# Patient Record
Sex: MALE | Race: WHITE | Employment: OTHER | URBAN - METROPOLITAN AREA
[De-identification: names, ages, dates, MRNs, and addresses within clinical notes are randomized per-mention and may not be internally consistent; named-entity substitution may affect disease eponyms.]

---

## 2017-01-24 PROBLEM — Z86.39 HISTORY OF ELEVATED GLUCOSE: Status: ACTIVE | Noted: 2017-01-24

## 2017-01-24 PROBLEM — F20.9 SCHIZOPHRENIA (HCC): Status: ACTIVE | Noted: 2017-01-24

## 2017-01-24 NOTE — PATIENT INSTRUCTIONS
Increasing abilify dosage from 10-->15 mg daily.   Follow up with [facility] [address] [place] [address] [phone]     Get fasting labs in 2-4 weeks
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
## 2017-02-02 ENCOUNTER — TELEPHONE (OUTPATIENT)
Dept: NEUROLOGY | Facility: CLINIC | Age: 32
End: 2017-02-02

## 2017-02-03 ENCOUNTER — OFFICE VISIT (OUTPATIENT)
Dept: NEUROLOGY | Facility: CLINIC | Age: 32
End: 2017-02-03

## 2017-02-03 VITALS — WEIGHT: 170 LBS | HEIGHT: 72 IN | RESPIRATION RATE: 15 BRPM | BODY MASS INDEX: 23.03 KG/M2

## 2017-02-03 DIAGNOSIS — R68.89 SPELLS OF DECREASED ATTENTIVENESS: Primary | ICD-10-CM

## 2017-02-03 PROCEDURE — 95816 EEG AWAKE AND DROWSY: CPT | Performed by: OTHER

## 2017-02-03 PROCEDURE — 99203 OFFICE O/P NEW LOW 30 MIN: CPT | Performed by: OTHER

## 2017-02-03 NOTE — PROGRESS NOTES
[facility]   Neurology Exam Note  Loran Kehr, MD    Patient:  Venkata Quiroga  :   1985    HPI:   Patient presents with:  Neurologic Problem    This patient is here with his mother.   He has a history of moderate intellectua total) by mouth 2 (two) times daily. Disp: 60 tablet Rfl: 1      Past Medical History   Diagnosis Date   • Intellectual disability    • Mitral valve prolapse           Past Surgical History    TONSILLECTOMY        No family history on file.    Social Histor increased tone. Sensory: Intact to Vibration, fine touch, and pin prick to the UE and LE.   DTR: 2+ in the upper and lower extremities, downgoing toes, no hoffmans, no clonus  Coordination: Finger to nose, heel to shin intact bilaterally.    Gait: Narrow

## 2017-02-03 NOTE — PATIENT INSTRUCTIONS
Refill policies:    • Allow 2 business days for refills; controlled substances may take longer. • Contact our office at least 5 days prior to running out of medication or submit request through the “request refill” option in your [employer] account.   • Ref have a procedure or additional testing performed. [facility]) will contact your insurance carrier to obtain pre-certification or prior authorization.     Unfortunately, MICHELLE has seen an increase in denial of payment even though the p

## 2017-02-07 ENCOUNTER — HOSPITAL ENCOUNTER (OUTPATIENT)
Dept: ELECTROPHYSIOLOGY | Facility: HOSPITAL | Age: 32
Discharge: HOME | End: 2017-02-07
Attending: Other
Payer: MEDICAID

## 2017-02-07 DIAGNOSIS — R68.89 SPELLS OF DECREASED ATTENTIVENESS: Primary | ICD-10-CM

## 2017-02-07 PROCEDURE — 95816 EEG AWAKE AND DROWSY: CPT

## 2017-02-13 ENCOUNTER — TELEPHONE (OUTPATIENT)
Dept: NEUROLOGY | Facility: CLINIC | Age: 32
End: 2017-02-13

## 2017-02-13 NOTE — TELEPHONE ENCOUNTER
Notified pt mother of Dr. Pastor Lipoma message below. She verbalized understanding and was thankful for the call.

## 2017-04-11 VITALS
DIASTOLIC BLOOD PRESSURE: 80 MMHG | HEART RATE: 122 BPM | OXYGEN SATURATION: 94 % | TEMPERATURE: 98 F | SYSTOLIC BLOOD PRESSURE: 115 MMHG | WEIGHT: 170 LBS | RESPIRATION RATE: 20 BRPM | BODY MASS INDEX: 23.03 KG/M2 | HEIGHT: 72 IN

## 2017-04-11 PROCEDURE — 96374 THER/PROPH/DIAG INJ IV PUSH: CPT

## 2017-04-11 PROCEDURE — 99284 EMERGENCY DEPT VISIT MOD MDM: CPT

## 2017-04-12 ENCOUNTER — HOSPITAL ENCOUNTER (EMERGENCY)
Facility: HOSPITAL | Age: 32
Discharge: HOME | End: 2017-04-12
Attending: EMERGENCY MEDICINE
Payer: MEDICAID

## 2017-04-12 DIAGNOSIS — R11.2 NON-INTRACTABLE VOMITING WITH NAUSEA, UNSPECIFIED VOMITING TYPE: Primary | ICD-10-CM

## 2017-04-12 PROCEDURE — 85025 COMPLETE CBC W/AUTO DIFF WBC: CPT | Performed by: EMERGENCY MEDICINE

## 2017-04-12 PROCEDURE — 80048 BASIC METABOLIC PNL TOTAL CA: CPT | Performed by: EMERGENCY MEDICINE

## 2017-04-12 RX ORDER — ONDANSETRON 8 MG/1
TABLET, ORALLY DISINTEGRATING ORAL
Status: DISCONTINUED
Start: 2017-04-12 | End: 2017-04-12

## 2017-04-12 RX ORDER — ONDANSETRON 2 MG/ML
4 INJECTION INTRAMUSCULAR; INTRAVENOUS ONCE
Status: COMPLETED | OUTPATIENT
Start: 2017-04-12 | End: 2017-04-12

## 2017-04-12 RX ORDER — HALOPERIDOL 1 MG/1
2 TABLET ORAL ONCE
Status: COMPLETED | OUTPATIENT
Start: 2017-04-12 | End: 2017-04-12

## 2017-04-12 NOTE — ED PROVIDER NOTES
Patient Seen in: [facility] AND [facility] Emergency Department    History   Patient presents with:  Nausea/Vomiting/Diarrhea (gastrointestinal)      HPI    Patient presents to the ED with his parents after having intermittent vomiting for the past 24 hours.   Pa 04/11/17 2345 94 %   O2 Device 04/11/17 2345 None (Room air)       Physical Exam   Constitutional: He appears well-developed and well-nourished. No distress. HENT:   Head: Normocephalic and atraumatic. Eyes: Right eye exhibits no discharge.  Left eye ex Patient in no significant distress on ED evaluation. Denying complaints, abdomen soft and nontender. Laboratory testing obtained, no significant abnormalities. Patient given aggressive IV fluids in the ED and IV Zofran.   Much improved and tolerating p.o
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
## 2017-07-14 NOTE — BH PROGRESS NOTE
Received called from [address] at [facility] requesting more information about the pt's insurance  Faxed copy of card and ecare printout
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
## 2017-07-14 NOTE — BH PROGRESS NOTE
Parents have already spoke with Linda and set up appointment, but not until September for outpatient resources. Spoke with Linda and they do not take overnight transfers of this type of case.  Stated they had 6 admissions to that unit today and to c
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
## 2017-08-01 PROCEDURE — 80050 GENERAL HEALTH PANEL: CPT | Performed by: INTERNAL MEDICINE

## 2017-08-01 PROCEDURE — 80061 LIPID PANEL: CPT | Performed by: INTERNAL MEDICINE

## 2017-08-01 NOTE — PROGRESS NOTES
Pt's  verified  Pt presented for a fasting blood draw. Per Dr. David Cosby ordered CBC CMP LIPID TSHR. Pt tolerated venipuncture well,specimens drawn from RT  arm  and sent out to [address] [place] [address] lab for testing.

## 2017-08-01 NOTE — PROGRESS NOTES
HPI:    Patient ID: Jacobo Mann is a 27year old male. HPIpt here for a fu on mental retardation and behavioral issues. Was residing in a group home up until September of last year.   Has been diagnosed with scizophrenia as well as beavioral probl Soft. There is no rebound. Musculoskeletal: He exhibits no edema. Neurological:   Confused somewhat anxious   Skin: No rash noted. No erythema.    Psychiatric:   Disoriented not aggressive              ASSESSMENT/PLAN:   Mental retardation  (primary enc

## 2018-03-20 ENCOUNTER — OFFICE VISIT (OUTPATIENT)
Dept: INTERNAL MEDICINE CLINIC | Facility: CLINIC | Age: 33
End: 2018-03-20

## 2018-03-20 VITALS
TEMPERATURE: 99 F | RESPIRATION RATE: 18 BRPM | HEIGHT: 72 IN | DIASTOLIC BLOOD PRESSURE: 70 MMHG | SYSTOLIC BLOOD PRESSURE: 116 MMHG | BODY MASS INDEX: 26.95 KG/M2 | OXYGEN SATURATION: 98 % | HEART RATE: 93 BPM | WEIGHT: 199 LBS

## 2018-03-20 DIAGNOSIS — Z00.00 WELLNESS EXAMINATION: Primary | ICD-10-CM

## 2018-03-20 DIAGNOSIS — F25.0 SCHIZOAFFECTIVE DISORDER, BIPOLAR TYPE (HCC): ICD-10-CM

## 2018-03-20 DIAGNOSIS — F79 MENTAL RETARDATION: ICD-10-CM

## 2018-03-20 PROCEDURE — 99395 PREV VISIT EST AGE 18-39: CPT | Performed by: INTERNAL MEDICINE

## 2018-03-20 RX ORDER — OLANZAPINE 5 MG/1
TABLET ORAL
Refills: 1 | COMMUNITY
Start: 2018-03-19 | End: 2019-04-22

## 2018-03-20 RX ORDER — OLANZAPINE 10 MG/1
TABLET ORAL
Refills: 1 | COMMUNITY
Start: 2018-03-10

## 2018-03-20 RX ORDER — LORAZEPAM 2 MG/1
TABLET ORAL
Refills: 2 | COMMUNITY
Start: 2018-02-08

## 2018-03-20 RX ORDER — DIVALPROEX SODIUM 250 MG/1
TABLET, DELAYED RELEASE ORAL
Refills: 1 | COMMUNITY
Start: 2018-03-10 | End: 2019-04-22

## 2018-03-20 NOTE — PROGRESS NOTES
HPI:    Patient ID: Patricia Garner is a 34year old male.     Pt here for fu on mental reterdation and newly diagnosed schizoaffective disorder - had recent labs to monitor level of depakoate and metabolic issues- only issue is mildly elevated cholestero breath sounds normal.   Abdominal: He exhibits no mass. There is no tenderness. Musculoskeletal: He exhibits no tenderness. Neurological: He is alert. Coordination normal.   Skin: No rash noted.    Psychiatric: His behavior is normal.   Slowed cognition

## 2018-04-27 ENCOUNTER — TELEPHONE (OUTPATIENT)
Dept: INTERNAL MEDICINE CLINIC | Facility: CLINIC | Age: 33
End: 2018-04-27

## 2018-04-27 NOTE — TELEPHONE ENCOUNTER
Pt's  verified  Per Dr Barnes Failing she spoke with Pt's mother Isreal Wei re: forms being ready for - given to  PPU- copy sent to scananaing

## 2019-04-22 ENCOUNTER — OFFICE VISIT (OUTPATIENT)
Dept: INTERNAL MEDICINE CLINIC | Facility: CLINIC | Age: 34
End: 2019-04-22
Payer: MEDICAID

## 2019-04-22 VITALS
HEART RATE: 114 BPM | SYSTOLIC BLOOD PRESSURE: 118 MMHG | HEIGHT: 72 IN | OXYGEN SATURATION: 98 % | BODY MASS INDEX: 29.66 KG/M2 | RESPIRATION RATE: 13 BRPM | WEIGHT: 219 LBS | DIASTOLIC BLOOD PRESSURE: 74 MMHG

## 2019-04-22 DIAGNOSIS — F25.9 SCHIZOAFFECTIVE DISORDER, UNSPECIFIED TYPE (HCC): ICD-10-CM

## 2019-04-22 DIAGNOSIS — R73.9 HYPERGLYCEMIA: ICD-10-CM

## 2019-04-22 DIAGNOSIS — Z00.00 WELLNESS EXAMINATION: Primary | ICD-10-CM

## 2019-04-22 DIAGNOSIS — F71 MENTAL RETARDATION, MODERATE (I.Q. 35-49): ICD-10-CM

## 2019-04-22 PROCEDURE — 83036 HEMOGLOBIN GLYCOSYLATED A1C: CPT | Performed by: INTERNAL MEDICINE

## 2019-04-22 PROCEDURE — 80053 COMPREHEN METABOLIC PANEL: CPT | Performed by: INTERNAL MEDICINE

## 2019-04-22 PROCEDURE — 99395 PREV VISIT EST AGE 18-39: CPT | Performed by: INTERNAL MEDICINE

## 2019-04-22 PROCEDURE — 36415 COLL VENOUS BLD VENIPUNCTURE: CPT | Performed by: INTERNAL MEDICINE

## 2019-04-22 PROCEDURE — 84443 ASSAY THYROID STIM HORMONE: CPT | Performed by: INTERNAL MEDICINE

## 2019-04-22 PROCEDURE — 80061 LIPID PANEL: CPT | Performed by: INTERNAL MEDICINE

## 2019-04-22 PROCEDURE — 82140 ASSAY OF AMMONIA: CPT | Performed by: INTERNAL MEDICINE

## 2019-04-22 RX ORDER — CLONAZEPAM 0.5 MG/1
TABLET ORAL
Refills: 2 | COMMUNITY
Start: 2019-01-04

## 2019-04-22 NOTE — PROGRESS NOTES
Pt presented to clinic today for blood draw. Per physician able to draw orders. Orders  documented within chart. Pt tolerated lab draw well.  verified. Orders drawn include: Ammonia, TSH.  Lipid, A1C, CMP, Fragile X  Site of draw: left arm

## 2019-04-22 NOTE — PROGRESS NOTES
HPI:    Patient ID: Junie Hull is a 28year old male. Pt here for a yearly physical exam and some labs. Father would like to have testing for fragile x syndrome.   IS under care of psychiatrist for anxiety and auditory hallucinations - on zyprexa Normocephalic. Right Ear: Cerumen present . Left Ear: Cerumen present. Mouth/Throat: No oropharyngeal exudate or pharynx erythema. Eyes: Pupils are equal, round, and reactive to light. No scleral icterus. Neck: Neck supple. No JVD present.    Cardio

## 2019-06-05 ENCOUNTER — OFFICE VISIT (OUTPATIENT)
Dept: INTERNAL MEDICINE CLINIC | Facility: CLINIC | Age: 34
End: 2019-06-05
Payer: MEDICARE

## 2019-06-05 VITALS
WEIGHT: 220.81 LBS | BODY MASS INDEX: 29.91 KG/M2 | SYSTOLIC BLOOD PRESSURE: 120 MMHG | DIASTOLIC BLOOD PRESSURE: 84 MMHG | HEART RATE: 92 BPM | HEIGHT: 72 IN | OXYGEN SATURATION: 95 %

## 2019-06-05 DIAGNOSIS — B35.1 ONYCHOMYCOSIS OF LEFT GREAT TOE: Primary | ICD-10-CM

## 2019-06-05 PROCEDURE — 99213 OFFICE O/P EST LOW 20 MIN: CPT | Performed by: INTERNAL MEDICINE

## 2019-06-05 RX ORDER — TERBINAFINE HYDROCHLORIDE 250 MG/1
250 TABLET ORAL DAILY
Qty: 30 TABLET | Refills: 3 | Status: SHIPPED | OUTPATIENT
Start: 2019-06-05

## 2019-06-05 NOTE — PROGRESS NOTES
HPI:    Patient ID: Flo Crisostomo is a 28year old male. Pt here for evaluation of bilateral toenail fungus on both feet Therapist, Clinical  Another, Therapist  Example, Therapist   left nail is markedly hypertrophic and deformed.     Review of Syst No prescriptions requested or ordered in this encounter       Imaging & Referrals:  None         Hugo Krause MD  6/5/2019

## 2019-06-18 ENCOUNTER — OFFICE VISIT (OUTPATIENT)
Dept: PODIATRY CLINIC | Facility: CLINIC | Age: 34
End: 2019-06-18
Payer: MEDICARE

## 2019-06-18 VITALS — WEIGHT: 220 LBS | HEIGHT: 72 IN | BODY MASS INDEX: 29.8 KG/M2

## 2019-06-18 DIAGNOSIS — B35.1 ONYCHOMYCOSIS: Primary | ICD-10-CM

## 2019-06-18 PROCEDURE — 99202 OFFICE O/P NEW SF 15 MIN: CPT | Performed by: PODIATRIST

## 2019-06-18 PROCEDURE — G0463 HOSPITAL OUTPT CLINIC VISIT: HCPCS | Performed by: PODIATRIST

## 2019-06-18 NOTE — PROGRESS NOTES
HPI:    Patient ID: Opal Sylvester is a 28year old male. This pleasant 42-year-old male presents as a new patient to me on consult from [address] [place] [address]. Accompanied today by his dad and the primary concern is associated with both of his great toenails. use of the medication, concerns, and expectations. He should complete 1 tablet/day for 90 days. Today I reduced more than 50% of the left great toenail without incident. It is not open or draining.   I discussed appropriate nail care and is well expectat

## 2019-06-26 ENCOUNTER — TELEPHONE (OUTPATIENT)
Dept: PODIATRY CLINIC | Facility: CLINIC | Age: 34
End: 2019-06-26

## 2019-06-26 NOTE — TELEPHONE ENCOUNTER
Pts father asking for after visit summary for 6/18 appt, states he can  at office. Pls advise thank you.

## 2019-06-26 NOTE — TELEPHONE ENCOUNTER
S/w Anjana Martin, pt's father. Let him know the AVS from 6/18 is at the . He states he will pick it up tomorrow.

## 2021-11-30 ENCOUNTER — TELEPHONE (OUTPATIENT)
Dept: INTERNAL MEDICINE CLINIC | Facility: CLINIC | Age: 36
End: 2021-11-30

## 2021-11-30 NOTE — TELEPHONE ENCOUNTER
Phone kept ringing wasn't able to LVM.     Spoke with father who stated that the pt now lives in [address] [place] [address]

## 2022-08-29 ENCOUNTER — TELEPHONE (OUTPATIENT)
Dept: INTERNAL MEDICINE CLINIC | Facility: CLINIC | Age: 37
End: 2022-08-29

## 2022-08-29 NOTE — TELEPHONE ENCOUNTER
Patient has a new PCP, Dr. Ashley Herrera. The last visit with Dr. Abram Angelucci was back in 2019.

## 2023-06-17 ENCOUNTER — OFFICE VISIT (OUTPATIENT)
Dept: FAMILY MEDICINE CLINIC | Facility: CLINIC | Age: 38
End: 2023-06-17
Payer: MEDICARE

## 2023-06-17 VITALS
RESPIRATION RATE: 16 BRPM | DIASTOLIC BLOOD PRESSURE: 84 MMHG | OXYGEN SATURATION: 98 % | SYSTOLIC BLOOD PRESSURE: 131 MMHG | HEART RATE: 95 BPM | TEMPERATURE: 97 F | WEIGHT: 215 LBS | HEIGHT: 71 IN | BODY MASS INDEX: 30.1 KG/M2

## 2023-06-17 DIAGNOSIS — H65.191 OTHER NON-RECURRENT ACUTE NONSUPPURATIVE OTITIS MEDIA OF RIGHT EAR: ICD-10-CM

## 2023-06-17 DIAGNOSIS — H60.501 ACUTE OTITIS EXTERNA OF RIGHT EAR, UNSPECIFIED TYPE: Primary | ICD-10-CM

## 2023-06-17 DIAGNOSIS — H61.22 IMPACTED CERUMEN OF LEFT EAR: ICD-10-CM

## 2023-06-17 PROCEDURE — 99213 OFFICE O/P EST LOW 20 MIN: CPT | Performed by: NURSE PRACTITIONER

## 2023-06-17 RX ORDER — CIPROFLOXACIN AND DEXAMETHASONE 3; 1 MG/ML; MG/ML
4 SUSPENSION/ DROPS AURICULAR (OTIC) 2 TIMES DAILY
Qty: 1 EACH | Refills: 0 | Status: SHIPPED | OUTPATIENT
Start: 2023-06-17 | End: 2023-06-27

## 2023-06-17 RX ORDER — AZITHROMYCIN 250 MG/1
TABLET, FILM COATED ORAL
Qty: 6 TABLET | Refills: 0 | Status: SHIPPED | OUTPATIENT
Start: 2023-06-17 | End: 2023-06-22

## 2023-12-26 ENCOUNTER — APPOINTMENT (OUTPATIENT)
Dept: GENERAL RADIOLOGY | Age: 38
End: 2023-12-26
Attending: NURSE PRACTITIONER
Payer: MEDICARE

## 2023-12-26 ENCOUNTER — HOSPITAL ENCOUNTER (OUTPATIENT)
Age: 38
Discharge: HOME | End: 2023-12-26
Payer: MEDICARE

## 2023-12-26 VITALS
OXYGEN SATURATION: 100 % | RESPIRATION RATE: 19 BRPM | SYSTOLIC BLOOD PRESSURE: 140 MMHG | TEMPERATURE: 98 F | HEART RATE: 78 BPM | DIASTOLIC BLOOD PRESSURE: 90 MMHG

## 2023-12-26 DIAGNOSIS — S92.414A CLOSED NONDISPLACED FRACTURE OF PROXIMAL PHALANX OF RIGHT GREAT TOE, INITIAL ENCOUNTER: Primary | ICD-10-CM

## 2023-12-26 PROCEDURE — 73660 X-RAY EXAM OF TOE(S): CPT | Performed by: NURSE PRACTITIONER

## 2023-12-26 PROCEDURE — 99213 OFFICE O/P EST LOW 20 MIN: CPT | Performed by: NURSE PRACTITIONER
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
## 2023-12-26 NOTE — DISCHARGE INSTRUCTIONS
Ice and elevate. Ibuprofen or Tylenol as needed for pain. Make a follow-up appointment with podiatry. Return for any concerns.

## (undated) NOTE — MR AVS SNAPSHOT
[facility]  [address], [address] [facility]  [address] [facility] [phone]               Thank you for choosing us for your health care visit with Tommy Joshua MD.  We are glad to serve you and happy to provide you with this summary of your family member will be picking up prescription, office must be given name of individual in advance and they must present an ID as well. ? The name of the person picking up your prescription must be documented in your chart.   Scheduling Tests    If your phy Take 1 tablet (15 mg total) by mouth daily. Commonly known as:  ABILIFY           Benztropine Mesylate 1 MG Tabs   Take 1 tablet (1 mg total) by mouth 2 (two) times daily.    Commonly known as:  COGENTIN           CALCIUM-MAGNESIUM-ZINC OR   Take by mouth

## (undated) NOTE — LETTER
June 18, 2019         Priscila Talbot MD  [address]      Patient: Charles Barros   YOB: 1985   Date of Visit: 6/18/2019       Dear Dr. Kathy Patton MD,    I saw your patient, Charles Barros, on 6/18/2019.  En

## (undated) NOTE — MR AVS SNAPSHOT
[address] [facility] [address] [facility] [id number]  [phone]               Thank you for choosing us for your health care visit with Avelino Fox DO.   We are glad to serve you and happy to provide you with this phipps These medications [id number] [place] [facility] [address] AT [facility], [phone], [address] [place] [address] [place], [facility] [address]    Hours:  24-hours Phone:  [phone]    - aripiprazole 15 MG Tabs  - Benztropi Suresh, [address] [facility], [address]  [phone]     [address], [address] [place] [address]   [place], [address] [facility]   [phone]     If you are confident that your benefit plan will not require a referral o